# Patient Record
Sex: FEMALE | Race: ASIAN | Employment: PART TIME | URBAN - METROPOLITAN AREA
[De-identification: names, ages, dates, MRNs, and addresses within clinical notes are randomized per-mention and may not be internally consistent; named-entity substitution may affect disease eponyms.]

---

## 2022-07-29 ENCOUNTER — OFFICE VISIT (OUTPATIENT)
Dept: INTERNAL MEDICINE CLINIC | Age: 22
End: 2022-07-29

## 2022-07-29 VITALS
BODY MASS INDEX: 21.69 KG/M2 | SYSTOLIC BLOOD PRESSURE: 125 MMHG | HEART RATE: 103 BPM | DIASTOLIC BLOOD PRESSURE: 84 MMHG | WEIGHT: 130.2 LBS | OXYGEN SATURATION: 97 % | TEMPERATURE: 98.7 F | HEIGHT: 65 IN | RESPIRATION RATE: 14 BRPM

## 2022-07-29 DIAGNOSIS — F41.9 ANXIETY: ICD-10-CM

## 2022-07-29 DIAGNOSIS — F43.10 PTSD (POST-TRAUMATIC STRESS DISORDER): ICD-10-CM

## 2022-07-29 DIAGNOSIS — F32.1 CURRENT MODERATE EPISODE OF MAJOR DEPRESSIVE DISORDER, UNSPECIFIED WHETHER RECURRENT (HCC): ICD-10-CM

## 2022-07-29 DIAGNOSIS — N63.23 MASS OF LOWER OUTER QUADRANT OF LEFT BREAST: ICD-10-CM

## 2022-07-29 DIAGNOSIS — Z00.00 ROUTINE GENERAL MEDICAL EXAMINATION AT A HEALTH CARE FACILITY: Primary | ICD-10-CM

## 2022-07-29 PROCEDURE — 99385 PREV VISIT NEW AGE 18-39: CPT | Performed by: INTERNAL MEDICINE

## 2022-07-29 RX ORDER — DESOGESTREL/ETHINYL ESTRADIOL AND ETHINYL ESTRADIOL 21-5 (28)
KIT ORAL
COMMUNITY
Start: 2022-07-12 | End: 2022-07-29 | Stop reason: SDUPTHER

## 2022-07-29 RX ORDER — DESOGESTREL/ETHINYL ESTRADIOL AND ETHINYL ESTRADIOL 21-5 (28)
KIT ORAL
Qty: 3 DOSE PACK | Refills: 1 | Status: SHIPPED | OUTPATIENT
Start: 2022-07-29

## 2022-07-29 NOTE — PROGRESS NOTES
HISTORY OF PRESENT ILLNESS  Amaury Gandara is a 24 y.o. female. HPI  New patient to our practice. Here for new patient CPE. Drives for [employer]. Seeing therapist, Annalisa Carrillo at [facility]. Seeing every week. Has Agoraphobia. Struggles with anxiety, depression and complex PTSD. Saw a few counselors at age 25. Started seeing Annalisa Simon this year. Pediatrician had tried her on Prozac and then Zoloft but neither helped. Prozac made her anxiety worse with icreased panic attacks. Zoloft felt very flat. Has an appointment with psychiatrist 8/20 for medicine eval.  No SI/HI. Issues falling asleep. Tired during the day. Exercise - walks in neighborhood. UTD HPV vaccinations. COVID 2 weeks ago - mild cold-like symptoms. Had had initial 2 vaccines with 2 booster    Past Medical History:   Diagnosis Date    Anxiety     Current moderate episode of major depressive disorder (HCC)     PTSD (post-traumatic stress disorder)      Past Surgical History:   Procedure Laterality Date    HX WISDOM TEETH EXTRACTION       No Known Allergies    Current Outpatient Medications:     Kariva, 28, 0.15-0.02 mgx21 /0.01 mg x 5 tab, TAKE 1 TABLET BY MOUTH EVERY DAY AS DIRECTED, Disp: 3 Dose Pack, Rfl: 1  Family History   Problem Relation Age of Onset    Hypertension Father     Cancer Father         synovial sarcoma      Social hx reviewed and updated in chart      Review of Systems   Constitutional: Negative. HENT: Negative. Eyes:  Negative for double vision, photophobia, pain, discharge and redness. Mild decrease in vision. Respiratory:  Positive for cough (mild residual cough post recent COVID). Negative for hemoptysis, sputum production, shortness of breath and wheezing. Cardiovascular: Negative. Gastrointestinal: Negative. Genitourinary: Negative. Musculoskeletal: Negative. Skin:  Negative for itching and rash.         Compulsive skin picking and subsequent scarring   Neurological:  Positive for headaches (intermittent - lack of sleep and stress are triggers. ibuprofen helps. ). Negative for dizziness, tingling, tremors, sensory change, speech change, focal weakness, seizures and weakness. Endo/Heme/Allergies: Negative. Psychiatric/Behavioral:  Positive for depression. Negative for suicidal ideas. The patient is nervous/anxious and has insomnia. Visit Vitals  /84   Pulse (!) 103   Temp 98.7 °F (37.1 °C) (Temporal)   Resp 14   Ht 5' 5\" (1.651 m)   Wt 130 lb 3.2 oz (59.1 kg)   SpO2 97%   BMI 21.67 kg/m²       Physical Exam  Vitals reviewed. Constitutional:       Appearance: Normal appearance. HENT:      Head: Normocephalic and atraumatic. Right Ear: Tympanic membrane normal.      Left Ear: Tympanic membrane normal.      Nose: Nose normal.      Mouth/Throat:      Mouth: Mucous membranes are moist.      Pharynx: Oropharynx is clear. Eyes:      Extraocular Movements: Extraocular movements intact. Conjunctiva/sclera: Conjunctivae normal.      Pupils: Pupils are equal, round, and reactive to light. Neck:      Vascular: No carotid bruit. Comments: No thyromegaly  Cardiovascular:      Rate and Rhythm: Normal rate and regular rhythm. Pulses: Normal pulses. Heart sounds: Normal heart sounds. Pulmonary:      Effort: Pulmonary effort is normal.      Breath sounds: Normal breath sounds. Chest:   Breasts:     Right: Normal. No axillary adenopathy or supraclavicular adenopathy. Left: Mass (left lateral breast  4 o'clock) present. No supraclavicular adenopathy. Comments: Bilat fibrous breasts  Abdominal:      General: Bowel sounds are normal. There is no distension. Palpations: Abdomen is soft. There is no mass. Tenderness: There is no abdominal tenderness. Musculoskeletal:      Cervical back: Neck supple. Right lower leg: No edema. Left lower leg: No edema.    Lymphadenopathy:      Cervical: No cervical adenopathy. Upper Body:      Right upper body: No supraclavicular, axillary or pectoral adenopathy. Left upper body: No supraclavicular adenopathy. Skin:     Comments: Scarring bilat upper arms, chest and neck   Neurological:      General: No focal deficit present. Mental Status: She is alert and oriented to person, place, and time. ASSESSMENT and PLAN  Diagnoses and all orders for this visit:    1. Routine general medical examination at a health care facility - due pap    2. Mass of lower outer quadrant of left breast - had US several years ago showing fibrocystic dz. This mass is new  -     US BREASTS COMPLETE; Future    3. Current moderate episode of major depressive disorder, unspecified whether recurrent ([facility] [address] - follow up therapist.  Upcoming appointment with psych NP to discuss medication    4. Anxiety - follow up therapist.  Upcoming appointment with psych NP to discuss medication    5.  PTSD (post-traumatic stress disorder) - follow up therapist.  Upcoming appointment with psych NP to discuss medication    Other orders  -     Kariva, 28, 0.15-0.02 mgx21 /0.01 mg x 5 tab; TAKE 1 TABLET BY MOUTH EVERY DAY AS DIRECTED

## 2022-07-29 NOTE — PROGRESS NOTES
Identified pt with two pt identifiers(name and ). Reviewed record in preparation for visit and have obtained necessary documentation. Chief Complaint   Patient presents with    New Patient     Est care    Medication Refill     Birth control originally from peds doctor      Pt reports upcoming therapy appt    Pt reports slight cough in the morning lingering from covid dx 22    Health Maintenance Due   Topic    Hepatitis C Screening     HPV Age 9Y-34Y (1 - 2-dose series)    Depression Monitoring     DTaP/Tdap/Td series (1 - Tdap)    Pap Smear      Vitals:    22 1052 22 1058   BP: (!) 141/92 125/84   Pulse: (!) 102 (!) 103   Resp: 14    Temp: 98.7 °F (37.1 °C)    TempSrc: Temporal    SpO2: 97%    Weight: 130 lb 3.2 oz (59.1 kg)    Height: 5' 5\" (1.651 m)    PainSc:   0 - No pain    LMP: 2022       Pain Scale: 0 - No pain/10        Coordination of Care Questionnaire:  :     1. Have you been to the ER, urgent care clinic since your last visit? Hospitalized since your last visit? No    2. Have you seen or consulted any other health care providers outside of the [address] [place] [address] since your last visit? Include any pap smears or colon screening. No- peds Dr. Wendi Childs prescribed BC    3. For patients aged 39-70: Has the patient had a colonoscopy / FIT/ Cologuard? NA - based on age      If the patient is female:    4. For patients aged 41-77: Has the patient had a mammogram within the past 2 years? NA - based on age or sex      11. For patients aged 21-65: Has the patient had a pap smear?  No

## 2023-01-31 RX ORDER — DESOGESTREL/ETHINYL ESTRADIOL AND ETHINYL ESTRADIOL 21-5 (28)
KIT ORAL
Qty: 84 TABLET | Refills: 1 | Status: SHIPPED | OUTPATIENT
Start: 2023-01-31

## 2023-09-29 RX ORDER — DESOG-E.ESTRADIOL/E.ESTRADIOL 21-5 (28)
1 TABLET ORAL DAILY
Qty: 84 TABLET | Refills: 0 | Status: SHIPPED | OUTPATIENT
Start: 2023-09-29

## 2023-09-29 NOTE — TELEPHONE ENCOUNTER
Refill request received from [facility] for   Requested Prescriptions     Pending Prescriptions Disp Refills    KARIVA 0.15-0.02/0.01 MG (21/5) per tablet [Pharmacy Med Name: KARIVA 28 DAY TABLET] 84 tablet 1     Sig: TAKE 1 TABLET BY MOUTH EVERY DAY AS DIRECTED     7/29/2022   Visit date not found     Routed to Dr Sarah France for review.

## 2024-01-16 ENCOUNTER — HOSPITAL ENCOUNTER (EMERGENCY)
Facility: HOSPITAL | Age: 24
Discharge: HOME | End: 2024-01-16
Attending: EMERGENCY MEDICINE
Payer: MEDICAID

## 2024-01-16 VITALS
HEIGHT: 65 IN | WEIGHT: 128.31 LBS | TEMPERATURE: 98.2 F | RESPIRATION RATE: 18 BRPM | DIASTOLIC BLOOD PRESSURE: 92 MMHG | SYSTOLIC BLOOD PRESSURE: 137 MMHG | HEART RATE: 80 BPM | OXYGEN SATURATION: 97 % | BODY MASS INDEX: 21.38 KG/M2

## 2024-01-16 DIAGNOSIS — S09.90XA INJURY OF HEAD, INITIAL ENCOUNTER: Primary | ICD-10-CM

## 2024-01-16 PROCEDURE — 99284 EMERGENCY DEPT VISIT MOD MDM: CPT

## 2024-01-16 PROCEDURE — 6370000000 HC RX 637 (ALT 250 FOR IP)

## 2024-01-16 PROCEDURE — 6360000002 HC RX W HCPCS

## 2024-01-16 PROCEDURE — 96372 THER/PROPH/DIAG INJ SC/IM: CPT

## 2024-01-16 RX ORDER — KETOROLAC TROMETHAMINE 30 MG/ML
30 INJECTION, SOLUTION INTRAMUSCULAR; INTRAVENOUS
Status: COMPLETED | OUTPATIENT
Start: 2024-01-16 | End: 2024-01-16

## 2024-01-16 RX ORDER — ACETAMINOPHEN 325 MG/1
650 TABLET ORAL
Status: COMPLETED | OUTPATIENT
Start: 2024-01-16 | End: 2024-01-16

## 2024-01-16 RX ADMIN — KETOROLAC TROMETHAMINE 30 MG: 30 INJECTION INTRAMUSCULAR; INTRAVENOUS at 14:09

## 2024-01-16 RX ADMIN — ACETAMINOPHEN 650 MG: 325 TABLET ORAL at 14:09

## 2024-01-16 ASSESSMENT — PAIN DESCRIPTION - DESCRIPTORS
DESCRIPTORS: ACHING
DESCRIPTORS: ACHING

## 2024-01-16 ASSESSMENT — PAIN SCALES - GENERAL
PAINLEVEL_OUTOF10: 5
PAINLEVEL_OUTOF10: 5

## 2024-01-16 ASSESSMENT — PAIN DESCRIPTION - LOCATION
LOCATION: HEAD
LOCATION: HEAD

## 2024-01-16 ASSESSMENT — PAIN - FUNCTIONAL ASSESSMENT
PAIN_FUNCTIONAL_ASSESSMENT: 0-10
PAIN_FUNCTIONAL_ASSESSMENT: PREVENTS OR INTERFERES SOME ACTIVE ACTIVITIES AND ADLS
PAIN_FUNCTIONAL_ASSESSMENT: PREVENTS OR INTERFERES SOME ACTIVE ACTIVITIES AND ADLS

## 2024-01-16 ASSESSMENT — PAIN DESCRIPTION - ORIENTATION
ORIENTATION: POSTERIOR
ORIENTATION: LEFT

## 2024-01-16 NOTE — ED PROVIDER NOTES
Refill: Capillary refill takes less than 2 seconds.      Coloration: Skin is not pale.      Findings: No rash.   Neurological:      General: No focal deficit present.      Mental Status: She is alert and oriented to person, place, and time. Mental status is at baseline.         DIAGNOSTIC RESULTS     EKG: All EKG's are interpreted by the Emergency Department Physician who either signs or Co-signs this chart in the absence of a cardiologist.        RADIOLOGY:   Non-plain film images such as CT, Ultrasound and MRI are read by the radiologist. Plain radiographic images are visualized and preliminarily interpreted by the emergency physician with the below findings:        Interpretation per the Radiologist below, if available at the time of this note:    No orders to display        LABS:  Labs Reviewed - No data to display    All other labs were within normal range or not returned as of this dictation.    EMERGENCY DEPARTMENT COURSE and DIFFERENTIAL DIAGNOSIS/MDM:   Vitals:    Vitals:    01/16/24 1243 01/16/24 1500   BP: (!) 164/116 (!) 137/92   Pulse: 90 80   Resp: 18 18   Temp: 98.2 °F (36.8 °C) 98.2 °F (36.8 °C)   TempSrc: Oral Oral   SpO2: 99% 97%   Weight: 58.2 kg (128 lb 4.9 oz)    Height: 1.638 m (5' 4.5\")            Medical Decision Making  23-year-old female presents with headache after head injury.  Patient is well-appearing, nontoxic, and with normal vital signs.  Headache most consistent with benign headache from either tension type headache vs migraine secondary to likely concussion.  No headache red flags on history or exam.  Neurologic exam without evidence of meningismus, AMS, or focal neurologic findings so doubt meningitis, encephalitis, or stroke.  Presentation not consistent with SAH.  Given history and physical, temporal arteritis unlikely.  Pupils are equal and reactive to light giving me a low suspicion for acute angle closure glaucoma.  Doubt carotid artery dissection given no focal neuro

## 2024-01-16 NOTE — ED TRIAGE NOTES
Patient arrives to ED reports headache since Saturday night. Reports that evening she went to a concert and hit her head